# Patient Record
Sex: MALE | Race: WHITE | NOT HISPANIC OR LATINO | Employment: FULL TIME | URBAN - METROPOLITAN AREA
[De-identification: names, ages, dates, MRNs, and addresses within clinical notes are randomized per-mention and may not be internally consistent; named-entity substitution may affect disease eponyms.]

---

## 2017-05-23 ENCOUNTER — ALLSCRIPTS OFFICE VISIT (OUTPATIENT)
Dept: OTHER | Facility: OTHER | Age: 22
End: 2017-05-23

## 2018-01-13 VITALS
BODY MASS INDEX: 22.38 KG/M2 | SYSTOLIC BLOOD PRESSURE: 112 MMHG | WEIGHT: 165.25 LBS | HEART RATE: 66 BPM | DIASTOLIC BLOOD PRESSURE: 62 MMHG | TEMPERATURE: 98.2 F | OXYGEN SATURATION: 97 % | HEIGHT: 72 IN

## 2018-08-22 ENCOUNTER — TELEPHONE (OUTPATIENT)
Dept: INTERNAL MEDICINE CLINIC | Facility: CLINIC | Age: 23
End: 2018-08-22

## 2018-08-22 DIAGNOSIS — A69.20 LYME DISEASE: Primary | ICD-10-CM

## 2019-01-07 ENCOUNTER — OFFICE VISIT (OUTPATIENT)
Dept: INTERNAL MEDICINE CLINIC | Facility: CLINIC | Age: 24
End: 2019-01-07
Payer: COMMERCIAL

## 2019-01-07 VITALS
WEIGHT: 165 LBS | SYSTOLIC BLOOD PRESSURE: 108 MMHG | TEMPERATURE: 98.4 F | DIASTOLIC BLOOD PRESSURE: 64 MMHG | HEART RATE: 69 BPM | OXYGEN SATURATION: 97 % | BODY MASS INDEX: 21.87 KG/M2 | HEIGHT: 73 IN

## 2019-01-07 DIAGNOSIS — A69.20 LYME DISEASE: Primary | ICD-10-CM

## 2019-01-07 DIAGNOSIS — R53.83 OTHER FATIGUE: ICD-10-CM

## 2019-01-07 PROCEDURE — 3008F BODY MASS INDEX DOCD: CPT | Performed by: INTERNAL MEDICINE

## 2019-01-07 PROCEDURE — 93000 ELECTROCARDIOGRAM COMPLETE: CPT | Performed by: INTERNAL MEDICINE

## 2019-01-07 PROCEDURE — 99214 OFFICE O/P EST MOD 30 MIN: CPT | Performed by: INTERNAL MEDICINE

## 2019-01-07 PROCEDURE — 1036F TOBACCO NON-USER: CPT | Performed by: INTERNAL MEDICINE

## 2019-01-07 RX ORDER — ALBUTEROL SULFATE 90 UG/1
2 AEROSOL, METERED RESPIRATORY (INHALATION) 4 TIMES DAILY
COMMUNITY
Start: 2011-08-05

## 2019-01-07 NOTE — PROGRESS NOTES
Assessment/Plan:    Lyme disease  Discussed with patient that Lyme is a treatable disease, and that he was treated with doxycycline for 3 weeks which is effective treatment  Discussed post Lyme syndrome since patient complaining of many of the symptoms better present in the syndrome, and the treatment for this is generally supportive care and time  Discussed that repeat testing would not add additional information as his testing would likely still be positive from his previous positive testing  Discussed that the medical literature shows no benefit of prolonged antibiotics or repeating a course of antibiotics for post Lyme syndrome  Discussed the small possibility of reinfection after Lyme treatment, and that if he were to develop classic symptoms that treatment could be done  With pt having more non-specific symptoms suggestive of post lyme syndrome, will not retreat at this time  Will check parvovirus, [place] virus panel  Will check Lyme test again since his IgM was negative  If testing all ok, and pt continues with fogginess, HA, anxiety, and depression, I recommend evaluation with neurology  Also discussed the possibility of anxiety and depression contributing to symptoms  EKG done today to look for any heart block, and no AV heart block was noted  Other fatigue  Check labs for this       Diagnoses and all orders for this visit:    Lyme disease  -     POCT ECG    Other fatigue  -     Lyme Antibody Profile with reflex to WB; Future  -     Parvovirus B19 antibody, IgG and IgM; Future  -     EBV acute panel; Future  -     Sedimentation rate, automated; Future  -     C-reactive protein; Future  -     Comprehensive metabolic panel; Future  -     CBC and differential; Future  -     TSH, 3rd generation with Free T4 reflex; Future    Other orders  -     albuterol (PROVENTIL HFA) 90 mcg/act inhaler;  Inhale 2 puffs 4 (four) times a day  -     ALBUTEROL IN; Inhale          Subjective:      Patient ID: Estefanía North is a 20 y o  male  Patient presented to urgent center August 8, 2018 for having the tick bite but 6 weeks previous to that [place] [address] [place] he had muscle aches and pains at that time  Lyme testing was done including the Western blot which came back positive for Lyme disease, patient was treated with 3 weeks of doxycycline  Patient reports his symptoms improved, but it was hard to tell since he was still hiking for another 3 months  Patient is back home now, complains of fatigue, some mild diffuse joint aches, slight mental fogginess  No fevers or chills, no rash, he has occasional mild headache, he does admit to some stiffness in the neck, no facial weakness, he has felt a little trujillo, feels that balance is slightly off  The following portions of the patient's history were reviewed and updated as appropriate: allergies, current medications, past family history, past medical history, past social history, past surgical history and problem list     Review of Systems   Constitutional: Positive for fatigue  Negative for chills and fever  HENT: Negative for congestion, nosebleeds, postnasal drip, sore throat and trouble swallowing  Eyes: Negative for pain and visual disturbance  Respiratory: Negative for cough, chest tightness, shortness of breath and wheezing  Cardiovascular: Negative for chest pain, palpitations and leg swelling  Gastrointestinal: Negative for abdominal pain, constipation, diarrhea, nausea and vomiting  Endocrine: Negative for polydipsia and polyuria  Genitourinary: Negative for dysuria, flank pain and hematuria  Musculoskeletal: Positive for arthralgias and neck stiffness  Skin: Negative for rash  Neurological: Positive for dizziness and headaches  Negative for tremors  Hematological: Does not bruise/bleed easily  Psychiatric/Behavioral: Positive for decreased concentration, dysphoric mood and sleep disturbance   Negative for confusion  The patient is nervous/anxious  Objective:      /64   Pulse 69   Temp 98 4 °F (36 9 °C)   Ht 6' 1" (1 854 m)   Wt 74 8 kg (165 lb)   SpO2 97%   BMI 21 77 kg/m²          Physical Exam   Constitutional: He is oriented to person, place, and time  He appears well-developed and well-nourished  No distress  HENT:   Head: Normocephalic and atraumatic  Right Ear: External ear normal    Left Ear: External ear normal    Eyes: Conjunctivae are normal  No scleral icterus  Neck: Normal range of motion  Neck supple  No tracheal deviation present  No thyromegaly present  Cardiovascular: Normal rate, regular rhythm and normal heart sounds  No murmur heard  Pulmonary/Chest: Effort normal and breath sounds normal  No respiratory distress  He has no wheezes  He has no rales  Abdominal: Soft  Bowel sounds are normal  There is no tenderness  There is no rebound and no guarding  Musculoskeletal: He exhibits no edema  Lymphadenopathy:     He has no cervical adenopathy  Neurological: He is alert and oriented to person, place, and time  No cranial nerve deficit  Psychiatric: He has a normal mood and affect  His behavior is normal  Judgment and thought content normal    Vitals reviewed

## 2019-01-07 NOTE — ASSESSMENT & PLAN NOTE
Discussed with patient that Lyme is a treatable disease, and that he was treated with doxycycline for 3 weeks which is effective treatment  Discussed post Lyme syndrome since patient complaining of many of the symptoms better present in the syndrome, and the treatment for this is generally supportive care and time  Discussed that repeat testing would not add additional information as his testing would likely still be positive from his previous positive testing  Discussed that the medical literature shows no benefit of prolonged antibiotics or repeating a course of antibiotics for post Lyme syndrome  Discussed the small possibility of reinfection after Lyme treatment, and that if he were to develop classic symptoms that treatment could be done  With pt having more non-specific symptoms suggestive of post lyme syndrome, will not retreat at this time  Will check parvovirus, [place] virus panel  Will check Lyme test again since his IgM was negative  If testing all ok, and pt continues with fogginess, HA, anxiety, and depression, I recommend evaluation with neurology  Also discussed the possibility of anxiety and depression contributing to symptoms  EKG done today to look for any heart block, and no AV heart block was noted

## 2019-01-07 NOTE — PATIENT INSTRUCTIONS
Problem List Items Addressed This Visit        Other    Lyme disease - Primary     Discussed with patient that Lyme is a treatable disease, and that he was treated with doxycycline for 3 weeks which is effective treatment  Discussed post Lyme syndrome since patient complaining of many of the symptoms better present in the syndrome, and the treatment for this is generally supportive care and time  Discussed that repeat testing would not add additional information as his testing would likely still be positive from his previous positive testing  Discussed that the medical literature shows no benefit of prolonged antibiotics or repeating a course of antibiotics for post Lyme syndrome  Discussed the small possibility of reinfection after Lyme treatment, and that if he were to develop classic symptoms that treatment could be done  With pt having more non-specific symptoms suggestive of post lyme syndrome, will not retreat at this time  Will check parvovirus, [place] virus panel  Will check Lyme test again since his IgM was negative  If testing all ok, and pt continues with fogginess, HA, anxiety, and depression, I recommend evaluation with neurology  Also discussed the possibility of anxiety and depression contributing to symptoms  EKG done today to look for any heart block, and no AV heart block was noted           Relevant Orders    POCT ECG (Completed)    Other fatigue     Check labs for this         Relevant Orders    Lyme Antibody Profile with reflex to WB    Parvovirus B19 antibody, IgG and IgM    EBV acute panel    Sedimentation rate, automated    C-reactive protein    Comprehensive metabolic panel    CBC and differential    TSH, 3rd generation with Free T4 reflex

## 2019-01-08 ENCOUNTER — APPOINTMENT (OUTPATIENT)
Dept: LAB | Facility: HOSPITAL | Age: 24
End: 2019-01-08
Payer: COMMERCIAL

## 2019-01-08 DIAGNOSIS — R53.83 OTHER FATIGUE: ICD-10-CM

## 2019-01-08 LAB
ALBUMIN SERPL BCP-MCNC: 4.2 G/DL (ref 3.5–5)
ALP SERPL-CCNC: 66 U/L (ref 46–116)
ALT SERPL W P-5'-P-CCNC: 23 U/L (ref 12–78)
ANION GAP SERPL CALCULATED.3IONS-SCNC: 8 MMOL/L (ref 4–13)
AST SERPL W P-5'-P-CCNC: 15 U/L (ref 5–45)
BASOPHILS # BLD AUTO: 0.01 THOUSANDS/ΜL (ref 0–0.1)
BASOPHILS NFR BLD AUTO: 0 % (ref 0–1)
BILIRUB SERPL-MCNC: 0.67 MG/DL (ref 0.2–1)
BUN SERPL-MCNC: 11 MG/DL (ref 5–25)
CALCIUM SERPL-MCNC: 9.1 MG/DL (ref 8.3–10.1)
CHLORIDE SERPL-SCNC: 103 MMOL/L (ref 100–108)
CO2 SERPL-SCNC: 27 MMOL/L (ref 21–32)
CREAT SERPL-MCNC: 0.77 MG/DL (ref 0.6–1.3)
CRP SERPL QL: <3 MG/L
EOSINOPHIL # BLD AUTO: 0.16 THOUSAND/ΜL (ref 0–0.61)
EOSINOPHIL NFR BLD AUTO: 4 % (ref 0–6)
ERYTHROCYTE [DISTWIDTH] IN BLOOD BY AUTOMATED COUNT: 12 % (ref 11.6–15.1)
ERYTHROCYTE [SEDIMENTATION RATE] IN BLOOD: 2 MM/HOUR (ref 0–10)
GFR SERPL CREATININE-BSD FRML MDRD: 126 ML/MIN/1.73SQ M
GLUCOSE P FAST SERPL-MCNC: 86 MG/DL (ref 65–99)
HCT VFR BLD AUTO: 47.1 % (ref 36.5–49.3)
HGB BLD-MCNC: 15.9 G/DL (ref 12–17)
IMM GRANULOCYTES # BLD AUTO: 0.01 THOUSAND/UL (ref 0–0.2)
IMM GRANULOCYTES NFR BLD AUTO: 0 % (ref 0–2)
LYMPHOCYTES # BLD AUTO: 1.78 THOUSANDS/ΜL (ref 0.6–4.47)
LYMPHOCYTES NFR BLD AUTO: 43 % (ref 14–44)
MCH RBC QN AUTO: 30.5 PG (ref 26.8–34.3)
MCHC RBC AUTO-ENTMCNC: 33.8 G/DL (ref 31.4–37.4)
MCV RBC AUTO: 90 FL (ref 82–98)
MONOCYTES # BLD AUTO: 0.4 THOUSAND/ΜL (ref 0.17–1.22)
MONOCYTES NFR BLD AUTO: 10 % (ref 4–12)
NEUTROPHILS # BLD AUTO: 1.78 THOUSANDS/ΜL (ref 1.85–7.62)
NEUTS SEG NFR BLD AUTO: 43 % (ref 43–75)
NRBC BLD AUTO-RTO: 0 /100 WBCS
PLATELET # BLD AUTO: 242 THOUSANDS/UL (ref 149–390)
PMV BLD AUTO: 9.7 FL (ref 8.9–12.7)
POTASSIUM SERPL-SCNC: 4.2 MMOL/L (ref 3.5–5.3)
PROT SERPL-MCNC: 7.4 G/DL (ref 6.4–8.2)
RBC # BLD AUTO: 5.21 MILLION/UL (ref 3.88–5.62)
SODIUM SERPL-SCNC: 138 MMOL/L (ref 136–145)
TSH SERPL DL<=0.05 MIU/L-ACNC: 1.19 UIU/ML (ref 0.36–3.74)
WBC # BLD AUTO: 4.14 THOUSAND/UL (ref 4.31–10.16)

## 2019-01-08 PROCEDURE — 86747 PARVOVIRUS ANTIBODY: CPT

## 2019-01-08 PROCEDURE — 36415 COLL VENOUS BLD VENIPUNCTURE: CPT

## 2019-01-08 PROCEDURE — 86664 EPSTEIN-BARR NUCLEAR ANTIGEN: CPT

## 2019-01-08 PROCEDURE — 86140 C-REACTIVE PROTEIN: CPT

## 2019-01-08 PROCEDURE — 84443 ASSAY THYROID STIM HORMONE: CPT

## 2019-01-08 PROCEDURE — 86617 LYME DISEASE ANTIBODY: CPT

## 2019-01-08 PROCEDURE — 86665 EPSTEIN-BARR CAPSID VCA: CPT

## 2019-01-08 PROCEDURE — 80053 COMPREHEN METABOLIC PANEL: CPT

## 2019-01-08 PROCEDURE — 85025 COMPLETE CBC W/AUTO DIFF WBC: CPT

## 2019-01-08 PROCEDURE — 86663 EPSTEIN-BARR ANTIBODY: CPT

## 2019-01-08 PROCEDURE — 86618 LYME DISEASE ANTIBODY: CPT

## 2019-01-08 PROCEDURE — 85652 RBC SED RATE AUTOMATED: CPT

## 2019-01-09 LAB
B BURGDOR IGG SER IA-ACNC: 3.26
B BURGDOR IGM SER IA-ACNC: 0.54
B19V IGG SER IA-ACNC: 4.4 INDEX (ref 0–0.8)
B19V IGM SER IA-ACNC: 0.2 INDEX (ref 0–0.8)
EBV EA IGG SER-ACNC: <9 U/ML (ref 0–8.9)
EBV NA IGG SER IA-ACNC: <18 U/ML (ref 0–17.9)
EBV PATRN SPEC IB-IMP: NORMAL
EBV VCA IGG SER IA-ACNC: <18 U/ML (ref 0–17.9)
EBV VCA IGM SER IA-ACNC: <36 U/ML (ref 0–35.9)

## 2019-01-10 LAB
B BURGDOR IGG PATRN SER IB-IMP: POSITIVE
B BURGDOR IGM PATRN SER IB-IMP: NEGATIVE
B BURGDOR18KD IGG SER QL IB: PRESENT
B BURGDOR23KD IGG SER QL IB: PRESENT
B BURGDOR23KD IGM SER QL IB: PRESENT
B BURGDOR28KD IGG SER QL IB: ABNORMAL
B BURGDOR30KD IGG SER QL IB: ABNORMAL
B BURGDOR39KD IGG SER QL IB: PRESENT
B BURGDOR39KD IGM SER QL IB: ABNORMAL
B BURGDOR41KD IGG SER QL IB: PRESENT
B BURGDOR41KD IGM SER QL IB: ABNORMAL
B BURGDOR45KD IGG SER QL IB: ABNORMAL
B BURGDOR58KD IGG SER QL IB: PRESENT
B BURGDOR66KD IGG SER QL IB: ABNORMAL
B BURGDOR93KD IGG SER QL IB: PRESENT

## 2020-07-17 ENCOUNTER — TELEMEDICINE (OUTPATIENT)
Dept: INTERNAL MEDICINE CLINIC | Facility: CLINIC | Age: 25
End: 2020-07-17
Payer: COMMERCIAL

## 2020-07-17 VITALS — WEIGHT: 170 LBS | HEIGHT: 74 IN | BODY MASS INDEX: 21.82 KG/M2 | TEMPERATURE: 98.7 F

## 2020-07-17 DIAGNOSIS — Z20.828 EXPOSURE TO SARS-ASSOCIATED CORONAVIRUS: Primary | ICD-10-CM

## 2020-07-17 DIAGNOSIS — Z20.828 EXPOSURE TO SARS-ASSOCIATED CORONAVIRUS: ICD-10-CM

## 2020-07-17 PROCEDURE — 99213 OFFICE O/P EST LOW 20 MIN: CPT | Performed by: NURSE PRACTITIONER

## 2020-07-17 PROCEDURE — 3008F BODY MASS INDEX DOCD: CPT | Performed by: NURSE PRACTITIONER

## 2020-07-17 PROCEDURE — U0003 INFECTIOUS AGENT DETECTION BY NUCLEIC ACID (DNA OR RNA); SEVERE ACUTE RESPIRATORY SYNDROME CORONAVIRUS 2 (SARS-COV-2) (CORONAVIRUS DISEASE [COVID-19]), AMPLIFIED PROBE TECHNIQUE, MAKING USE OF HIGH THROUGHPUT TECHNOLOGIES AS DESCRIBED BY CMS-2020-01-R: HCPCS

## 2020-07-17 NOTE — PROGRESS NOTES
Virtual Regular Visit      Assessment/Plan:    Problem List Items Addressed This Visit        Other    Exposure to SARS-associated coronavirus - Primary     Stay out of work  Test for [employer] at home  Go to ER if worsening         Relevant Orders    MISC COVID-19 TEST- Collected at [facility] or [facility]               Reason for visit is   Chief Complaint   Patient presents with    COVID-19     sore throat, headache, sore glands around throat,  The patient works with a person that was dx with COVID    Virtual Regular Visit        Encounter provider IMANI Warner    Provider located at [address] [place], [place] [id number]      Recent Visits  No visits were found meeting these conditions  Showing recent visits within past 7 days and meeting all other requirements     Today's Visits  Date Type Provider Dept   07/17/20 Telemedicine Dillon Warner today's visits and meeting all other requirements     Future Appointments  No visits were found meeting these conditions  Showing future appointments within next 150 days and meeting all other requirements        The patient was identified by name and date of birth  Matt Delgado was informed that this is a telemedicine visit and that the visit is being conducted through [email or web address] and patient was informed that this is not a secure, HIPAA-complaint platform  He agrees to proceed     My office door was closed  No one else was in the room  He acknowledged consent and understanding of privacy and security of the video platform  The patient has agreed to participate and understands they can discontinue the visit at any time  Patient is aware this is a billable service  Subjective  Matt Delgado is a 30 y o  male          Patient is here for sore throat, headache, sore glands, 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  Denies sob  No fever or chills       No past medical history on file  Past Surgical History:   Procedure Laterality Date    NO PAST SURGERIES         Current Outpatient Medications   Medication Sig Dispense Refill    albuterol (PROVENTIL HFA) 90 mcg/act inhaler Inhale 2 puffs 4 (four) times a day       No current facility-administered medications for this visit  No Known Allergies    Review of Systems   Constitutional: Negative  HENT: Positive for sore throat  Eyes: Negative  Respiratory: Negative  Cardiovascular: Negative  Gastrointestinal: Negative  Musculoskeletal: Negative  Neurological: Positive for headaches  Video Exam    Vitals:    07/17/20 0921   Temp: 98 7 °F (37 1 °C)   Weight: 77 1 kg (170 lb)   Height: 6' 2" (1 88 m)       Physical Exam   Constitutional: He appears well-developed and well-nourished  Vitals reviewed  I spent 15 minutes directly with the patient during this visit      VIRTUAL VISIT DISCLAIMER    Estefanía North acknowledges that he has consented to an online visit or consultation  He understands that the online visit is based solely on information provided by him, and that, in the absence of a face-to-face physical evaluation by the physician, the diagnosis he receives is both limited and provisional in terms of accuracy and completeness  This is not intended to replace a full medical face-to-face evaluation by the physician  Estefanía North understands and accepts these terms

## 2020-07-17 NOTE — LETTER
July 17, 2020     Patient: Juan Luis Ames   YOB: 1995   Date of Visit: 7/17/2020       To Whom it May Concern:    Juan Luis Ames is under my professional care  He was seen on 7/17/2020    He is being tested for covid19 and cannot return to work until cleared         Sincerely,          IMANI Perez        CC: No Recipients

## 2020-07-22 LAB — SARS-COV-2 RNA SPEC QL NAA+PROBE: NOT DETECTED

## 2021-01-05 ENCOUNTER — TELEPHONE (OUTPATIENT)
Dept: INTERNAL MEDICINE CLINIC | Facility: CLINIC | Age: 26
End: 2021-01-05

## 2021-01-05 ENCOUNTER — TELEMEDICINE (OUTPATIENT)
Dept: INTERNAL MEDICINE CLINIC | Facility: CLINIC | Age: 26
End: 2021-01-05
Payer: COMMERCIAL

## 2021-01-05 DIAGNOSIS — J02.9 SORE THROAT: ICD-10-CM

## 2021-01-05 DIAGNOSIS — J02.9 SORE THROAT: Primary | ICD-10-CM

## 2021-01-05 PROCEDURE — 3725F SCREEN DEPRESSION PERFORMED: CPT | Performed by: INTERNAL MEDICINE

## 2021-01-05 PROCEDURE — U0003 INFECTIOUS AGENT DETECTION BY NUCLEIC ACID (DNA OR RNA); SEVERE ACUTE RESPIRATORY SYNDROME CORONAVIRUS 2 (SARS-COV-2) (CORONAVIRUS DISEASE [COVID-19]), AMPLIFIED PROBE TECHNIQUE, MAKING USE OF HIGH THROUGHPUT TECHNOLOGIES AS DESCRIBED BY CMS-2020-01-R: HCPCS | Performed by: INTERNAL MEDICINE

## 2021-01-05 PROCEDURE — 99213 OFFICE O/P EST LOW 20 MIN: CPT | Performed by: INTERNAL MEDICINE

## 2021-01-05 PROCEDURE — 1036F TOBACCO NON-USER: CPT | Performed by: INTERNAL MEDICINE

## 2021-01-05 NOTE — ASSESSMENT & PLAN NOTE
With pt's constellation of symptoms, recommend testing for COVID    Patient instructed quarantine is until testing comes back, and if testing is positive, I recommend he isolate for 10 days from the onset of his symptoms, and not having fever for 24 hours, and symptoms of COVID improving

## 2021-01-05 NOTE — TELEPHONE ENCOUNTER
Put patient on my schedule as a  virtual appointment so I can get more information to be able to order the test

## 2021-01-05 NOTE — TELEPHONE ENCOUNTER
Patient called stating he has a sore throat, runny nose and tightness in his chest   He asked if you would write an order for a covid test   He is not allowed to go back to work until he has the test  Please let me know and I will call the patient back    Thank You

## 2021-01-05 NOTE — PROGRESS NOTES
Virtual Regular Visit      Assessment/Plan:    Problem List Items Addressed This Visit        Other    Sore throat - Primary     With pt's constellation of symptoms, recommend testing for COVID  Patient instructed quarantine is until testing comes back, and if testing is positive, I recommend he isolate for 10 days from the onset of his symptoms, and not having fever for 24 hours, and symptoms of COVID improving         Relevant Orders    Novel Coronavirus (COVID-19), PCR [facility] - Collected at [facility] or [facility]               Reason for visit is   Chief Complaint   Patient presents with    Virtual Regular Visit        Encounter provider Maryellen Whyte MD    Provider located at [address] [place] [id number]      Recent Visits  No visits were found meeting these conditions  Showing recent visits within past 7 days and meeting all other requirements     Today's Visits  Date Type Provider Dept   01/05/21 Telemedicine [facility]   01/05/21 Telephone Maryellen Whyte MD [address] today's visits and meeting all other requirements     Future Appointments  No visits were found meeting these conditions  Showing future appointments within next 150 days and meeting all other requirements        The patient was identified by name and date of birth  Yulisa Soto was informed that this is a telemedicine visit and that the visit is being conducted [place] [facility] and patient was informed that this is a secure, HIPAA-compliant platform  He agrees to proceed     My office door was closed  No one else was in the room  He acknowledged consent and understanding of privacy and security of the video platform  The patient has agreed to participate and understands they can discontinue the visit at any time  Patient is aware this is a billable service       Subjective  Yulisa Soto is a 30 y o  male        Pt had sore throat, runny nose, onset last evening  He developed a little tightness in his chest this morning which he has had in the past with respiratory infections  He has fatigue, no loss of sense of smell or taste, no diarrhea, no abdominal pain, no headache, no flu-like aches, no shortness of breath  No fevers or chills       No past medical history on file  Past Surgical History:   Procedure Laterality Date    NO PAST SURGERIES         Current Outpatient Medications   Medication Sig Dispense Refill    albuterol (PROVENTIL HFA) 90 mcg/act inhaler Inhale 2 puffs 4 (four) times a day       No current facility-administered medications for this visit  No Known Allergies    Review of Systems   Constitutional: Positive for fatigue  Negative for chills and fever  HENT: Positive for rhinorrhea and sore throat  Negative for congestion, nosebleeds, postnasal drip and trouble swallowing  Eyes: Negative for pain  Respiratory: Positive for chest tightness  Negative for cough, shortness of breath and wheezing  Cardiovascular: Negative for chest pain, palpitations and leg swelling  Gastrointestinal: Negative for abdominal pain, constipation, diarrhea, nausea and vomiting  Endocrine: Negative for polydipsia and polyuria  Genitourinary: Negative for dysuria, flank pain and hematuria  Musculoskeletal: Negative for arthralgias  Skin: Negative for rash  Neurological: Negative for dizziness, tremors and headaches  Hematological: Does not bruise/bleed easily  Psychiatric/Behavioral: Negative for confusion and dysphoric mood  The patient is not nervous/anxious  Video Exam    There were no vitals filed for this visit  Physical Exam   It was my intent to perform this visit via video technology but the patient was not able to do a video connection so the visit was completed via audio telephone only      I spent 15 minutes with patient today in which greater than 50% of the time was spent in counseling/coordination of care regarding covid concerns      VIRTUAL VISIT DISCLAIMER    Yeimi Even acknowledges that he has consented to an online visit or consultation  He understands that the online visit is based solely on information provided by him, and that, in the absence of a face-to-face physical evaluation by the physician, the diagnosis he receives is both limited and provisional in terms of accuracy and completeness  This is not intended to replace a full medical face-to-face evaluation by the physician  Yeimi Even understands and accepts these terms

## 2021-01-05 NOTE — PATIENT INSTRUCTIONS
Problem List Items Addressed This Visit        Other    Sore throat - Primary     With pt's constellation of symptoms, recommend testing for COVID    Patient instructed quarantine is until testing comes back, and if testing is positive, I recommend he isolate for 10 days from the onset of his symptoms, and not having fever for 24 hours, and symptoms of COVID improving         Relevant Orders    Novel Coronavirus (COVID-19), PCR [facility] - Collected at   [facility]  or [facility]

## 2021-01-06 LAB — SARS-COV-2 RNA SPEC QL NAA+PROBE: NOT DETECTED

## 2021-01-07 DIAGNOSIS — J02.9 SORE THROAT: Primary | ICD-10-CM

## 2021-01-07 RX ORDER — AZITHROMYCIN 250 MG/1
250 TABLET, FILM COATED ORAL EVERY 24 HOURS
Qty: 6 TABLET | Refills: 0 | Status: SHIPPED | OUTPATIENT
Start: 2021-01-07 | End: 2021-01-12

## 2021-02-09 ENCOUNTER — TELEPHONE (OUTPATIENT)
Dept: OTHER | Facility: OTHER | Age: 26
End: 2021-02-09

## 2021-02-09 NOTE — TELEPHONE ENCOUNTER
Patient request callback to obtain a referral to be tested for covid stating he had direct exposure with someone who tested positive / He stated he have no symptoms     Paged out to Dr Shin Mercury

## 2021-02-10 ENCOUNTER — TELEMEDICINE (OUTPATIENT)
Dept: INTERNAL MEDICINE CLINIC | Facility: CLINIC | Age: 26
End: 2021-02-10
Payer: COMMERCIAL

## 2021-02-10 DIAGNOSIS — Z20.822 CLOSE EXPOSURE TO COVID-19 VIRUS: ICD-10-CM

## 2021-02-10 DIAGNOSIS — Z20.822 CLOSE EXPOSURE TO COVID-19 VIRUS: Primary | ICD-10-CM

## 2021-02-10 LAB — SARS-COV-2 RNA RESP QL NAA+PROBE: NEGATIVE

## 2021-02-10 PROCEDURE — U0003 INFECTIOUS AGENT DETECTION BY NUCLEIC ACID (DNA OR RNA); SEVERE ACUTE RESPIRATORY SYNDROME CORONAVIRUS 2 (SARS-COV-2) (CORONAVIRUS DISEASE [COVID-19]), AMPLIFIED PROBE TECHNIQUE, MAKING USE OF HIGH THROUGHPUT TECHNOLOGIES AS DESCRIBED BY CMS-2020-01-R: HCPCS | Performed by: INTERNAL MEDICINE

## 2021-02-10 PROCEDURE — U0005 INFEC AGEN DETEC AMPLI PROBE: HCPCS | Performed by: INTERNAL MEDICINE

## 2021-02-10 PROCEDURE — 99441 PR PHYS/QHP TELEPHONE EVALUATION 5-10 MIN: CPT | Performed by: INTERNAL MEDICINE

## 2021-02-10 NOTE — PROGRESS NOTES
COVID-19 Virtual Visit     Assessment/Plan:    Problem List Items Addressed This Visit        Other    Close exposure to COVID-19 virus - Primary    Relevant Orders    Novel Coronavirus (Covid-19),PCR [facility] - [facility] or [facility]         Disposition:     I recommended COVID-19 PCR testing on or after day 5 since last exposure and if negative can end quarantine after 7 days  Patient was instructed to watch for symptoms until 14 days after exposure  If patient were to develop symptoms, they should immediately self isolate and call our office for further guidance  I have spent 7 minutes directly with the patient  Greater than 50% of this time was spent in counseling/coordination of care regarding: instructions for management  Impression 1  COVID exposure with last date of exposure on February 5th-patient has self quarantine will be going for testing on February 10th-she is aware that if this is negative he can discontinue quarantine after February 12th  He knows to call for symptoms      addendum- COVID testing negative- patient can discontinue quarantine as of February the 18  Encounter provider Doug Schwartz MD    Provider located at [address] [place] [address]  [address]ia [address]  [phone]    Recent Visits  No visits were found meeting these conditions  Showing recent visits within past 7 days and meeting all other requirements     Today's Visits  Date Type Provider Dept   02/10/21 Telemedicine oDug Schwartz  [facility]   Showing today's visits and meeting all other requirements     Future Appointments  No visits were found meeting these conditions     Showing future appointments within next 150 days and meeting all other requirements        Patient agrees to participate in a virtual check in via telephone or video visit instead of presenting to the office to address urgent/immediate medical needs  Patient is aware this is a billable service  After connecting through Telephone, the patient was identified by name and date of birth  Burton Matos was informed that this was a telemedicine visit and that the exam was being conducted confidentially over secure lines  My office door was closed  No one else was in the room  Burton Matos acknowledged consent and understanding of privacy and security of the telemedicine visit  I informed the patient that I have reviewed his record in Epic and presented the opportunity for him to ask any questions regarding the visit today  The patient agreed to participate  Subjective:   Burton Matos is a 30 y o  male who is concerned about COVID-19  Patient is currently asymptomatic  Patient denies fever, chills, fatigue, malaise, congestion, rhinorrhea, sore throat, anosmia, loss of taste, cough, shortness of breath, chest tightness, abdominal pain, nausea, vomiting, diarrhea, myalgias and headaches  Date of exposure: 2/5/2021     This patient called on February 9th stating that he had had exposure from  February 3rd to February 5th 7 who has tested positive for COVID-19  He is entirely asymptomatic  He has been self quarantine since he became aware of the information  Lab Results   Component Value Date    SARSCOV2 Not Detected 01/05/2021     No past medical history on file  Past Surgical History:   Procedure Laterality Date    NO PAST SURGERIES       Current Outpatient Medications   Medication Sig Dispense Refill    albuterol (PROVENTIL HFA) 90 mcg/act inhaler Inhale 2 puffs 4 (four) times a day       No current facility-administered medications for this visit  No Known Allergies    Review of Systems   Constitutional: Negative for chills, fatigue and fever  HENT: Negative for congestion, rhinorrhea and sore throat  Respiratory: Negative for cough, chest tightness and shortness of breath      Gastrointestinal: Negative for abdominal pain, diarrhea, nausea and vomiting  Musculoskeletal: Negative for myalgias  Neurological: Negative for headaches  Objective: There were no vitals filed for this visit  Physical Exam  VIRTUAL VISIT DISCLAIMER    Ginny Jamshid acknowledges that he has consented to an online visit or consultation  He understands that the online visit is based solely on information provided by him, and that, in the absence of a face-to-face physical evaluation by the physician, the diagnosis he receives is both limited and provisional in terms of accuracy and completeness  This is not intended to replace a full medical face-to-face evaluation by the physician  Ginny Breen understands and accepts these terms

## 2021-02-11 ENCOUNTER — TELEPHONE (OUTPATIENT)
Dept: INTERNAL MEDICINE CLINIC | Facility: CLINIC | Age: 26
End: 2021-02-11

## 2021-02-11 NOTE — TELEPHONE ENCOUNTER
----- Message from Gigi May MD sent at 2/11/2021  4:47 AM EST -----  Please call the patient regarding his Coronavirus testing-normal- I evaluated this patient while on-call this week- he can discontinue quarantine as February the 18

## 2021-02-11 NOTE — TELEPHONE ENCOUNTER
LEFT MESSAGE INFORMING PT OF HIS RESULTS    TOLD HIM HE CAN CALL BACK WITH ANY QUESTIONS OR F/U WITH IS PCP'S OFFICE

## 2021-02-19 ENCOUNTER — TELEPHONE (OUTPATIENT)
Dept: INTERNAL MEDICINE CLINIC | Facility: CLINIC | Age: 26
End: 2021-02-19

## 2021-02-19 NOTE — TELEPHONE ENCOUNTER
Pt work requires a note stating that he was off of work because he needed to quarantine  Pt said he is allowed to return to work on Monday  Pt will  when done

## 2021-12-14 ENCOUNTER — OFFICE VISIT (OUTPATIENT)
Dept: INTERNAL MEDICINE CLINIC | Facility: CLINIC | Age: 26
End: 2021-12-14
Payer: COMMERCIAL

## 2021-12-14 VITALS
BODY MASS INDEX: 23.38 KG/M2 | WEIGHT: 176.4 LBS | HEART RATE: 65 BPM | OXYGEN SATURATION: 99 % | DIASTOLIC BLOOD PRESSURE: 76 MMHG | SYSTOLIC BLOOD PRESSURE: 120 MMHG | HEIGHT: 73 IN

## 2021-12-14 DIAGNOSIS — Z11.4 SCREENING FOR HIV (HUMAN IMMUNODEFICIENCY VIRUS): ICD-10-CM

## 2021-12-14 DIAGNOSIS — Z00.00 WELLNESS EXAMINATION: Primary | ICD-10-CM

## 2021-12-14 DIAGNOSIS — Z00.00 ROUTINE GENERAL MEDICAL EXAMINATION AT A HEALTH CARE FACILITY: ICD-10-CM

## 2021-12-14 DIAGNOSIS — Z11.3 SCREEN FOR STD (SEXUALLY TRANSMITTED DISEASE): ICD-10-CM

## 2021-12-14 DIAGNOSIS — Z11.59 NEED FOR HEPATITIS C SCREENING TEST: ICD-10-CM

## 2021-12-14 DIAGNOSIS — L29.0 RECTAL ITCHING: ICD-10-CM

## 2021-12-14 PROCEDURE — 1036F TOBACCO NON-USER: CPT | Performed by: INTERNAL MEDICINE

## 2021-12-14 PROCEDURE — 99395 PREV VISIT EST AGE 18-39: CPT | Performed by: INTERNAL MEDICINE

## 2021-12-14 PROCEDURE — 3008F BODY MASS INDEX DOCD: CPT | Performed by: INTERNAL MEDICINE

## 2022-03-29 ENCOUNTER — APPOINTMENT (OUTPATIENT)
Dept: LAB | Facility: HOSPITAL | Age: 27
End: 2022-03-29
Payer: COMMERCIAL

## 2022-03-29 DIAGNOSIS — Z00.00 ROUTINE GENERAL MEDICAL EXAMINATION AT A HEALTH CARE FACILITY: ICD-10-CM

## 2022-03-29 DIAGNOSIS — Z11.3 SCREEN FOR STD (SEXUALLY TRANSMITTED DISEASE): ICD-10-CM

## 2022-03-29 DIAGNOSIS — Z11.4 SCREENING FOR HIV (HUMAN IMMUNODEFICIENCY VIRUS): ICD-10-CM

## 2022-03-29 DIAGNOSIS — Z11.59 NEED FOR HEPATITIS C SCREENING TEST: ICD-10-CM

## 2022-03-29 LAB
25(OH)D3 SERPL-MCNC: 34.3 NG/ML (ref 30–100)
ALBUMIN SERPL BCP-MCNC: 4.5 G/DL (ref 3.5–5)
ALP SERPL-CCNC: 47 U/L (ref 34–104)
ALT SERPL W P-5'-P-CCNC: 50 U/L (ref 7–52)
ANION GAP SERPL CALCULATED.3IONS-SCNC: 7 MMOL/L (ref 4–13)
AST SERPL W P-5'-P-CCNC: 33 U/L (ref 13–39)
BASOPHILS # BLD AUTO: 0.02 THOUSANDS/ΜL (ref 0–0.1)
BASOPHILS NFR BLD AUTO: 0 % (ref 0–1)
BILIRUB SERPL-MCNC: 0.85 MG/DL (ref 0.2–1)
BUN SERPL-MCNC: 11 MG/DL (ref 5–25)
CALCIUM SERPL-MCNC: 9.3 MG/DL (ref 8.4–10.2)
CHLORIDE SERPL-SCNC: 101 MMOL/L (ref 96–108)
CHOLEST SERPL-MCNC: 166 MG/DL
CO2 SERPL-SCNC: 30 MMOL/L (ref 21–32)
CREAT SERPL-MCNC: 0.8 MG/DL (ref 0.6–1.3)
EOSINOPHIL # BLD AUTO: 0.11 THOUSAND/ΜL (ref 0–0.61)
EOSINOPHIL NFR BLD AUTO: 2 % (ref 0–6)
ERYTHROCYTE [DISTWIDTH] IN BLOOD BY AUTOMATED COUNT: 11.9 % (ref 11.6–15.1)
GFR SERPL CREATININE-BSD FRML MDRD: 121 ML/MIN/1.73SQ M
GLUCOSE P FAST SERPL-MCNC: 67 MG/DL (ref 65–99)
HCT VFR BLD AUTO: 47.7 % (ref 36.5–49.3)
HCV AB SER QL: NORMAL
HDLC SERPL-MCNC: 51 MG/DL
HGB BLD-MCNC: 16.4 G/DL (ref 12–17)
IMM GRANULOCYTES # BLD AUTO: 0.01 THOUSAND/UL (ref 0–0.2)
IMM GRANULOCYTES NFR BLD AUTO: 0 % (ref 0–2)
LDLC SERPL CALC-MCNC: 95 MG/DL (ref 0–100)
LYMPHOCYTES # BLD AUTO: 2.07 THOUSANDS/ΜL (ref 0.6–4.47)
LYMPHOCYTES NFR BLD AUTO: 42 % (ref 14–44)
MCH RBC QN AUTO: 30.6 PG (ref 26.8–34.3)
MCHC RBC AUTO-ENTMCNC: 34.4 G/DL (ref 31.4–37.4)
MCV RBC AUTO: 89 FL (ref 82–98)
MONOCYTES # BLD AUTO: 0.45 THOUSAND/ΜL (ref 0.17–1.22)
MONOCYTES NFR BLD AUTO: 9 % (ref 4–12)
NEUTROPHILS # BLD AUTO: 2.23 THOUSANDS/ΜL (ref 1.85–7.62)
NEUTS SEG NFR BLD AUTO: 47 % (ref 43–75)
NRBC BLD AUTO-RTO: 0 /100 WBCS
PLATELET # BLD AUTO: 262 THOUSANDS/UL (ref 149–390)
PMV BLD AUTO: 9.9 FL (ref 8.9–12.7)
POTASSIUM SERPL-SCNC: 3.6 MMOL/L (ref 3.5–5.3)
PROT SERPL-MCNC: 7.1 G/DL (ref 6.4–8.4)
RBC # BLD AUTO: 5.36 MILLION/UL (ref 3.88–5.62)
SODIUM SERPL-SCNC: 138 MMOL/L (ref 135–147)
TRIGL SERPL-MCNC: 100 MG/DL
TSH SERPL DL<=0.05 MIU/L-ACNC: 3.63 UIU/ML (ref 0.45–5.33)
WBC # BLD AUTO: 4.89 THOUSAND/UL (ref 4.31–10.16)

## 2022-03-29 PROCEDURE — 87389 HIV-1 AG W/HIV-1&-2 AB AG IA: CPT

## 2022-03-29 PROCEDURE — 87591 N.GONORRHOEAE DNA AMP PROB: CPT

## 2022-03-29 PROCEDURE — 86803 HEPATITIS C AB TEST: CPT

## 2022-03-29 PROCEDURE — 80061 LIPID PANEL: CPT

## 2022-03-29 PROCEDURE — 84443 ASSAY THYROID STIM HORMONE: CPT

## 2022-03-29 PROCEDURE — 36415 COLL VENOUS BLD VENIPUNCTURE: CPT

## 2022-03-29 PROCEDURE — 87491 CHLMYD TRACH DNA AMP PROBE: CPT

## 2022-03-29 PROCEDURE — 82306 VITAMIN D 25 HYDROXY: CPT

## 2022-03-29 PROCEDURE — 85025 COMPLETE CBC W/AUTO DIFF WBC: CPT

## 2022-03-29 PROCEDURE — 80053 COMPREHEN METABOLIC PANEL: CPT

## 2022-03-30 LAB
C TRACH DNA SPEC QL NAA+PROBE: NEGATIVE
HIV 1+2 AB+HIV1 P24 AG SERPL QL IA: NORMAL
N GONORRHOEA DNA SPEC QL NAA+PROBE: NEGATIVE